# Patient Record
Sex: MALE | Race: WHITE | NOT HISPANIC OR LATINO | Employment: UNEMPLOYED | URBAN - METROPOLITAN AREA
[De-identification: names, ages, dates, MRNs, and addresses within clinical notes are randomized per-mention and may not be internally consistent; named-entity substitution may affect disease eponyms.]

---

## 2024-05-04 ENCOUNTER — APPOINTMENT (OUTPATIENT)
Dept: CARDIOLOGY | Facility: HOSPITAL | Age: 34
End: 2024-05-04
Payer: MEDICAID

## 2024-05-04 ENCOUNTER — HOSPITAL ENCOUNTER (OUTPATIENT)
Dept: CARDIOLOGY | Facility: HOSPITAL | Age: 34
Discharge: HOME | End: 2024-05-04
Payer: MEDICAID

## 2024-05-04 ENCOUNTER — HOSPITAL ENCOUNTER (EMERGENCY)
Facility: HOSPITAL | Age: 34
Discharge: HOME | End: 2024-05-04
Attending: STUDENT IN AN ORGANIZED HEALTH CARE EDUCATION/TRAINING PROGRAM
Payer: MEDICAID

## 2024-05-04 ENCOUNTER — APPOINTMENT (OUTPATIENT)
Dept: RADIOLOGY | Facility: HOSPITAL | Age: 34
End: 2024-05-04
Payer: MEDICAID

## 2024-05-04 VITALS
TEMPERATURE: 98.6 F | BODY MASS INDEX: 25.18 KG/M2 | RESPIRATION RATE: 19 BRPM | WEIGHT: 170 LBS | OXYGEN SATURATION: 98 % | HEART RATE: 78 BPM | HEIGHT: 69 IN | DIASTOLIC BLOOD PRESSURE: 77 MMHG | SYSTOLIC BLOOD PRESSURE: 144 MMHG

## 2024-05-04 DIAGNOSIS — F14.90 COCAINE USE: Primary | ICD-10-CM

## 2024-05-04 DIAGNOSIS — F19.90 POLYSUBSTANCE USE DISORDER: ICD-10-CM

## 2024-05-04 LAB
ALBUMIN SERPL BCP-MCNC: 4.5 G/DL (ref 3.4–5)
ALP SERPL-CCNC: 62 U/L (ref 33–120)
ALT SERPL W P-5'-P-CCNC: 17 U/L (ref 10–52)
ANION GAP BLDV CALCULATED.4IONS-SCNC: 11 MMOL/L (ref 10–25)
ANION GAP SERPL CALC-SCNC: 12 MMOL/L (ref 10–20)
APAP SERPL-MCNC: <10 UG/ML
APTT PPP: 35 SECONDS (ref 27–38)
AST SERPL W P-5'-P-CCNC: 22 U/L (ref 9–39)
BASE EXCESS BLDV CALC-SCNC: 3.5 MMOL/L (ref -2–3)
BASOPHILS # BLD AUTO: 0.02 X10*3/UL (ref 0–0.1)
BASOPHILS NFR BLD AUTO: 0.4 %
BILIRUB SERPL-MCNC: 0.8 MG/DL (ref 0–1.2)
BNP SERPL-MCNC: 12 PG/ML (ref 0–99)
BODY TEMPERATURE: ABNORMAL
BUN SERPL-MCNC: 14 MG/DL (ref 6–23)
CA-I BLDV-SCNC: 1.21 MMOL/L (ref 1.1–1.33)
CALCIUM SERPL-MCNC: 9.9 MG/DL (ref 8.6–10.3)
CARDIAC TROPONIN I PNL SERPL HS: 6 NG/L (ref 0–20)
CARDIAC TROPONIN I PNL SERPL HS: 7 NG/L (ref 0–20)
CHLORIDE BLDV-SCNC: 103 MMOL/L (ref 98–107)
CHLORIDE SERPL-SCNC: 103 MMOL/L (ref 98–107)
CK SERPL-CCNC: 165 U/L (ref 0–325)
CO2 SERPL-SCNC: 26 MMOL/L (ref 21–32)
CREAT SERPL-MCNC: 0.91 MG/DL (ref 0.5–1.3)
EGFRCR SERPLBLD CKD-EPI 2021: >90 ML/MIN/1.73M*2
EOSINOPHIL # BLD AUTO: 0.04 X10*3/UL (ref 0–0.7)
EOSINOPHIL NFR BLD AUTO: 0.8 %
ERYTHROCYTE [DISTWIDTH] IN BLOOD BY AUTOMATED COUNT: 14.3 % (ref 11.5–14.5)
ETHANOL SERPL-MCNC: <10 MG/DL
GLUCOSE BLDV-MCNC: 82 MG/DL (ref 74–99)
GLUCOSE SERPL-MCNC: 82 MG/DL (ref 74–99)
HCO3 BLDV-SCNC: 26.7 MMOL/L (ref 22–26)
HCT VFR BLD AUTO: 39.9 % (ref 41–52)
HCT VFR BLD EST: 40 % (ref 41–52)
HGB BLD-MCNC: 12.8 G/DL (ref 13.5–17.5)
HGB BLDV-MCNC: 13.4 G/DL (ref 13.5–17.5)
HOLD SPECIMEN: NORMAL
IMM GRANULOCYTES # BLD AUTO: 0.01 X10*3/UL (ref 0–0.7)
IMM GRANULOCYTES NFR BLD AUTO: 0.2 % (ref 0–0.9)
INHALED O2 CONCENTRATION: 21 %
INR PPP: 1.4 (ref 0.9–1.1)
LACTATE BLDV-SCNC: 1.9 MMOL/L (ref 0.4–2)
LYMPHOCYTES # BLD AUTO: 1.06 X10*3/UL (ref 1.2–4.8)
LYMPHOCYTES NFR BLD AUTO: 20.7 %
MAGNESIUM SERPL-MCNC: 1.6 MG/DL (ref 1.6–2.4)
MCH RBC QN AUTO: 24.5 PG (ref 26–34)
MCHC RBC AUTO-ENTMCNC: 32.1 G/DL (ref 32–36)
MCV RBC AUTO: 76 FL (ref 80–100)
MONOCYTES # BLD AUTO: 0.25 X10*3/UL (ref 0.1–1)
MONOCYTES NFR BLD AUTO: 4.9 %
NEUTROPHILS # BLD AUTO: 3.74 X10*3/UL (ref 1.2–7.7)
NEUTROPHILS NFR BLD AUTO: 73 %
NRBC BLD-RTO: 0 /100 WBCS (ref 0–0)
OXYHGB MFR BLDV: 44.4 % (ref 45–75)
PCO2 BLDV: 35 MM HG (ref 41–51)
PH BLDV: 7.49 PH (ref 7.33–7.43)
PLATELET # BLD AUTO: 166 X10*3/UL (ref 150–450)
PO2 BLDV: 28 MM HG (ref 35–45)
POTASSIUM BLDV-SCNC: 3.6 MMOL/L (ref 3.5–5.3)
POTASSIUM SERPL-SCNC: 3.5 MMOL/L (ref 3.5–5.3)
PROT SERPL-MCNC: 7.5 G/DL (ref 6.4–8.2)
PROTHROMBIN TIME: 16.1 SECONDS (ref 9.8–12.8)
RBC # BLD AUTO: 5.22 X10*6/UL (ref 4.5–5.9)
SALICYLATES SERPL-MCNC: <3 MG/DL
SAO2 % BLDV: 48 % (ref 45–75)
SODIUM BLDV-SCNC: 137 MMOL/L (ref 136–145)
SODIUM SERPL-SCNC: 137 MMOL/L (ref 136–145)
WBC # BLD AUTO: 5.1 X10*3/UL (ref 4.4–11.3)

## 2024-05-04 PROCEDURE — 71045 X-RAY EXAM CHEST 1 VIEW: CPT | Performed by: RADIOLOGY

## 2024-05-04 PROCEDURE — 99284 EMERGENCY DEPT VISIT MOD MDM: CPT | Mod: 25

## 2024-05-04 PROCEDURE — 36415 COLL VENOUS BLD VENIPUNCTURE: CPT | Performed by: EMERGENCY MEDICINE

## 2024-05-04 PROCEDURE — 93005 ELECTROCARDIOGRAM TRACING: CPT | Mod: 59

## 2024-05-04 PROCEDURE — 84484 ASSAY OF TROPONIN QUANT: CPT | Performed by: EMERGENCY MEDICINE

## 2024-05-04 PROCEDURE — 82947 ASSAY GLUCOSE BLOOD QUANT: CPT | Mod: 59 | Performed by: EMERGENCY MEDICINE

## 2024-05-04 PROCEDURE — 85025 COMPLETE CBC W/AUTO DIFF WBC: CPT | Performed by: EMERGENCY MEDICINE

## 2024-05-04 PROCEDURE — 71045 X-RAY EXAM CHEST 1 VIEW: CPT

## 2024-05-04 PROCEDURE — 85730 THROMBOPLASTIN TIME PARTIAL: CPT | Performed by: EMERGENCY MEDICINE

## 2024-05-04 PROCEDURE — 93005 ELECTROCARDIOGRAM TRACING: CPT

## 2024-05-04 PROCEDURE — 80143 DRUG ASSAY ACETAMINOPHEN: CPT | Performed by: EMERGENCY MEDICINE

## 2024-05-04 PROCEDURE — 82550 ASSAY OF CK (CPK): CPT | Performed by: EMERGENCY MEDICINE

## 2024-05-04 PROCEDURE — 83735 ASSAY OF MAGNESIUM: CPT | Performed by: EMERGENCY MEDICINE

## 2024-05-04 PROCEDURE — 85610 PROTHROMBIN TIME: CPT | Performed by: EMERGENCY MEDICINE

## 2024-05-04 PROCEDURE — 99284 EMERGENCY DEPT VISIT MOD MDM: CPT | Performed by: STUDENT IN AN ORGANIZED HEALTH CARE EDUCATION/TRAINING PROGRAM

## 2024-05-04 PROCEDURE — 83880 ASSAY OF NATRIURETIC PEPTIDE: CPT | Performed by: EMERGENCY MEDICINE

## 2024-05-04 PROCEDURE — 84132 ASSAY OF SERUM POTASSIUM: CPT | Performed by: EMERGENCY MEDICINE

## 2024-05-04 RX ORDER — NALOXONE HYDROCHLORIDE 4 MG/.1ML
SPRAY NASAL
Status: DISCONTINUED
Start: 2024-05-04 | End: 2024-05-04 | Stop reason: HOSPADM

## 2024-05-04 RX ORDER — NAPROXEN SODIUM 220 MG/1
324 TABLET, FILM COATED ORAL ONCE
Status: DISCONTINUED | OUTPATIENT
Start: 2024-05-04 | End: 2024-05-04

## 2024-05-04 RX ORDER — NITROGLYCERIN 0.4 MG/1
0.4 TABLET SUBLINGUAL ONCE
Status: DISCONTINUED | OUTPATIENT
Start: 2024-05-04 | End: 2024-05-04

## 2024-05-04 RX ORDER — NALOXONE HYDROCHLORIDE 4 MG/.1ML
4 SPRAY NASAL AS NEEDED
Status: ACTIVE
Start: 2024-05-04 | End: 2024-05-05

## 2024-05-04 ASSESSMENT — LIFESTYLE VARIABLES
EVER HAD A DRINK FIRST THING IN THE MORNING TO STEADY YOUR NERVES TO GET RID OF A HANGOVER: NO
HAVE YOU EVER FELT YOU SHOULD CUT DOWN ON YOUR DRINKING: NO
HAVE PEOPLE ANNOYED YOU BY CRITICIZING YOUR DRINKING: NO
TOTAL SCORE: 0
EVER FELT BAD OR GUILTY ABOUT YOUR DRINKING: NO

## 2024-05-04 ASSESSMENT — COLUMBIA-SUICIDE SEVERITY RATING SCALE - C-SSRS
6. HAVE YOU EVER DONE ANYTHING, STARTED TO DO ANYTHING, OR PREPARED TO DO ANYTHING TO END YOUR LIFE?: NO
1. IN THE PAST MONTH, HAVE YOU WISHED YOU WERE DEAD OR WISHED YOU COULD GO TO SLEEP AND NOT WAKE UP?: NO
2. HAVE YOU ACTUALLY HAD ANY THOUGHTS OF KILLING YOURSELF?: NO

## 2024-05-04 ASSESSMENT — PAIN SCALES - GENERAL
PAINLEVEL_OUTOF10: 6
PAINLEVEL_OUTOF10: 0 - NO PAIN
PAINLEVEL_OUTOF10: 0 - NO PAIN

## 2024-05-04 ASSESSMENT — PAIN - FUNCTIONAL ASSESSMENT
PAIN_FUNCTIONAL_ASSESSMENT: 0-10
PAIN_FUNCTIONAL_ASSESSMENT: 0-10

## 2024-05-04 ASSESSMENT — PAIN DESCRIPTION - DESCRIPTORS: DESCRIPTORS: ACHING

## 2024-05-04 ASSESSMENT — PAIN DESCRIPTION - LOCATION: LOCATION: CHEST

## 2024-05-04 NOTE — Clinical Note
Pt left with treatment incomplete. This nurse removed IV attempted to have Dr. Rapp speak to pt but pt walked out.

## 2024-05-04 NOTE — ED PROVIDER NOTES
"EMERGENCY DEPARTMENT ENCOUNTER      Pt Name: Emmanuel Darby  MRN: [id number]  Birthdate 1990  Date of evaluation: 5/4/2024  Provider: Alexi Rapp DO    CHIEF COMPLAINT       Chief Complaint   Patient presents with    Chest Pain     HISTORY OF PRESENT ILLNESS    Emmanuel Darby is a 33 y.o. year old male who presents to the ER for chest pain. He was driving, got pulled over. Took a hit of crack via smoking. Was arrested, told police he had chest pain.   Currently rates 6/10, left sided, nonradiating, crushing. + nausea, + vomiting, - dyspnea, - fainting, no recent urinary or stool changes  No unexpected weight changes  Is homeless, was previously incarcerated until 1 year ago    PMH: MI 2023, CHF, polysubstance use, doesn't have a doctor. Has been off his meds for 1 year.   PSH: inguinal herniorrhaphy;l  FH: grandmother -[place]  NKDA  + tobacco, - alcohol, + heroin IVDU, no Hx endocarditis     PAST MEDICAL HISTORY     Past Medical History:   Diagnosis Date    CHF (congestive heart failure) (Multi)     MI (myocardial infarction) (Multi)      CURRENT MEDICATIONS       Discharge Medication List as of 5/4/2024  7:37 PM        SURGICAL HISTORY       Past Surgical History:   Procedure Laterality Date    HERNIA REPAIR       ALLERGIES     Patient has no known allergies.  FAMILY HISTORY     No family history on file.  SOCIAL HISTORY       Social History     Tobacco Use    Smoking status: Every Day     Current packs/day: 1.00     Types: Cigarettes    Smokeless tobacco: Never   Substance Use Topics    Alcohol use: Never    Drug use: Yes     Types: \"Crack\" cocaine, Cocaine, Heroin, MDMA (ecstacy)     PHYSICAL EXAM  (up to 7 for level 4, 8 or more for level 5)     ED Triage Vitals   Temp Pulse Resp BP   -- -- -- --      SpO2 Temp src Heart Rate Source Patient Position   -- -- -- --      BP Location FiO2 (%)     -- --       Physical Exam  Vitals and nursing note reviewed.   Constitutional:       General: He is not in acute distress.   "   Appearance: Normal appearance. He is not ill-appearing.   HENT:      Head: Normocephalic and atraumatic. No raccoon eyes, Ruffin's sign, contusion or laceration.      Jaw: No trismus or malocclusion.      Right Ear: External ear normal.      Left Ear: External ear normal.      Mouth/Throat:      Mouth: Mucous membranes are moist.      Pharynx: Oropharynx is clear.   Eyes:      Extraocular Movements: Extraocular movements intact.      Pupils: Pupils are equal, round, and reactive to light.   Neck:      Trachea: No tracheal deviation.   Cardiovascular:      Rate and Rhythm: Normal rate and regular rhythm.      Pulses: Normal pulses.   Pulmonary:      Effort: No respiratory distress.      Breath sounds: No wheezing, rhonchi or rales.   Chest:      Chest wall: No tenderness.   Abdominal:      General: Abdomen is flat.      Palpations: Abdomen is soft. There is no mass.      Tenderness: There is no abdominal tenderness.   Musculoskeletal:         General: No tenderness or signs of injury.      Cervical back: Normal range of motion. No tenderness.      Right lower leg: No edema.      Left lower leg: No edema.   Skin:     Coloration: Skin is not jaundiced or pale.      Findings: No petechiae, rash or wound.   Neurological:      General: No focal deficit present.      Mental Status: He is alert.   Psychiatric:         Speech: Speech normal.         Thought Content: Thought content does not include homicidal or suicidal ideation.        DIAGNOSTIC RESULTS   LABS:  Labs Reviewed   CBC WITH AUTO DIFFERENTIAL - Abnormal       Result Value    WBC 5.1      nRBC 0.0      RBC 5.22      Hemoglobin 12.8 (*)     Hematocrit 39.9 (*)     MCV 76 (*)     MCH 24.5 (*)     MCHC 32.1      RDW 14.3      Platelets 166      Neutrophils % 73.0      Immature Granulocytes %, Automated 0.2      Lymphocytes % 20.7      Monocytes % 4.9      Eosinophils % 0.8      Basophils % 0.4      Neutrophils Absolute 3.74      Immature Granulocytes Absolute,  Automated 0.01      Lymphocytes Absolute 1.06 (*)     Monocytes Absolute 0.25      Eosinophils Absolute 0.04      Basophils Absolute 0.02     PROTIME-INR - Abnormal    Protime 16.1 (*)     INR 1.4 (*)    BLOOD GAS VENOUS FULL PANEL - Abnormal    POCT pH, Venous 7.49 (*)     POCT pCO2, Venous 35 (*)     POCT pO2, Venous 28 (*)     POCT SO2, Venous 48      POCT Oxy Hemoglobin, Venous 44.4 (*)     POCT Hematocrit Calculated, Venous 40.0 (*)     POCT Sodium, Venous 137      POCT Potassium, Venous 3.6      POCT Chloride, Venous 103      POCT Ionized Calicum, Venous 1.21      POCT Glucose, Venous 82      POCT Lactate, Venous 1.9      POCT Base Excess, Venous 3.5 (*)     POCT HCO3 Calculated, Venous 26.7 (*)     POCT Hemoglobin, Venous 13.4 (*)     POCT Anion Gap, Venous 11.0      Patient Temperature        FiO2 21     COMPREHENSIVE METABOLIC PANEL - Normal    Glucose 82      Sodium 137      Potassium 3.5      Chloride 103      Bicarbonate 26      Anion Gap 12      Urea Nitrogen 14      Creatinine 0.91      eGFR >90      Calcium 9.9      Albumin 4.5      Alkaline Phosphatase 62      Total Protein 7.5      AST 22      Bilirubin, Total 0.8      ALT 17     MAGNESIUM - Normal    Magnesium 1.60     APTT - Normal    aPTT 35      Narrative:     The APTT is no longer used for monitoring Unfractionated Heparin Therapy. For monitoring Heparin Therapy, use the Heparin Assay.   B-TYPE NATRIURETIC PEPTIDE - Normal    BNP 12      Narrative:        <100 pg/mL - Heart failure unlikely  100-299 pg/mL - Intermediate probability of acute heart                  failure exacerbation. Correlate with clinical                  context and patient history.    >=300 pg/mL - Heart Failure likely. Correlate with clinical                  context and patient history.    BNP testing is performed using different testing methodology at [facility] than at other [facility]. Direct result comparisons should only be made within the same  method.      SERIAL TROPONIN-INITIAL - Normal    Troponin I, High Sensitivity 6      Narrative:     Less than 99th percentile of normal range cutoff-  Female and children under 18 years old <14 ng/L; Male <21 ng/L: Negative  Repeat testing should be performed if clinically indicated.     Female and children under 18 years old 14-50 ng/L; Male 21-50 ng/L:  Consistent with possible cardiac damage and possible increased clinical   risk. Serial measurements may help to assess extent of myocardial damage.     >50 ng/L: Consistent with cardiac damage, increased clinical risk and  myocardial infarction. Serial measurements may help assess extent of   myocardial damage.      NOTE: Children less than 1 year old may have higher baseline troponin   levels and results should be interpreted in conjunction with the overall   clinical context.     NOTE: Troponin I testing is performed using a different   testing methodology at [facility] than at other   [facility]. Direct result comparisons should only   be made within the same method.   ACUTE TOXICOLOGY PANEL, BLOOD - Normal    Acetaminophen <10.0      Salicylate  <3      Alcohol <10     SERIAL TROPONIN, 1 HOUR - Normal    Troponin I, High Sensitivity 7      Narrative:     Less than 99th percentile of normal range cutoff-  Female and children under 18 years old <14 ng/L; Male <21 ng/L: Negative  Repeat testing should be performed if clinically indicated.     Female and children under 18 years old 14-50 ng/L; Male 21-50 ng/L:  Consistent with possible cardiac damage and possible increased clinical   risk. Serial measurements may help to assess extent of myocardial damage.     >50 ng/L: Consistent with cardiac damage, increased clinical risk and  myocardial infarction. Serial measurements may help assess extent of   myocardial damage.      NOTE: Children less than 1 year old may have higher baseline troponin   levels and results should be interpreted in  conjunction with the overall   clinical context.     NOTE: Troponin I testing is performed using a different   testing methodology at [facility] than at other   [facility] hospitals. Direct result comparisons should only   be made within the same method.   CREATINE KINASE - Normal    Creatine Kinase 165     TROPONIN SERIES- (INITIAL, 1 HR)    Narrative:     The following orders were created for panel order Troponin I Series, High Sensitivity (0, 1 HR).  Procedure                               Abnormality         Status                     ---------                               -----------         ------                     Troponin I, High Sensiti...[[id number]]  Normal              Final result               Troponin, High Sensitivi...[[id number]]  Normal              Final result                 Please view results for these tests on the individual orders.     All other labs were within normal range or not returned as of this dictation.  Imaging  XR chest 1 view   Final Result   1.  No active cardiopulmonary process.             Signed by: Patrick Samaniego 5/4/2024 5:24 PM   Dictation workstation:   [id number]         Procedure  Procedures  EMERGENCY DEPARTMENT COURSE/MDM:   Medical Decision Making    Vitals:    Vitals:    05/04/24 1800 05/04/24 1830 05/04/24 1930 05/04/24 1949   BP: (!) 130/96 115/88 144/77 144/77   BP Location:   Left arm Left arm   Patient Position:   Lying Sitting   Pulse: 56 62 58 78   Resp: 12 17 (!) 21 19   Temp:   37 °C (98.6 °F) 37 °C (98.6 °F)   TempSrc:   Temporal Temporal   SpO2: 98% 100% 98% 98%   Weight:       Height:         Emmanuel Darby is a male 33 y.o. who presents to the ER for cocaine induced chest pain. On arrival the patients vital signs were: Afebrile, Tachycardic, Normotensive, Regular RR, and Oxygenating well on room air. History obtained from: patient.  Cardiac workup initiated.  ED Course as of 05/04/24 2130   Sat May 04, 2024   1531 Patient eloped, IV removed prior  to leaving. [CB]   1602 Law enforcement returned patient to the ED. chest pain increased to 7/10, patient is now diaphoretic, pale, tachypneic, breathing heavily, somnolent [CB]   1642 BLOOD GAS VENOUS FULL PANEL(!)  Respiratory alkalosis, no lactic acidemia [CB]   1644 CBC and Auto Differential(!)  No acute leukocytosis, leukopenia, thrombocytosis, thrombocytopenia, or anemia [CB]   1727 Comprehensive Metabolic Panel  Normoglycemic, no acute electrolyte or hepatorenal abnormality [CB]   1727 Creatine Kinase  No rhabdo [CB]   1727 Acute Toxicology Panel, Blood  No elevation of alcohol, salicylate, acetaminophen [CB]   1839 Under assessment chest pain improved to 3/10 [CB]   1839 XR chest 1 view  IMPRESSION:  1.  No active cardiopulmonary process.   [CB]   1839 Troponin I, High Sensitivity: 6  Not elevated, ACS less likely [CB]   1839 B-Type Natriuretic Peptide  Not elevated, heart failure less likely [CB]   1839 Acute Toxicology Panel, Blood  No elevation of alcohol, salicylate, acetaminophen [CB]   1931 Troponin I Series, High Sensitivity (0, 1 HR)  No delta Trope, ACS unlikely. [CB]   1938 On reassessment alert no longer slurring no longer pale no longer diaphoretic vital signs stable. [CB]      ED Course User Index  [CB] Alexi Rapp DO         Diagnoses as of 05/04/24 2130   Cocaine use   Polysubstance use disorder     External Records Reviewed: I reviewed recent and relevant outside records including inpatient notes, outpatient records    Shared decision making for disposition  Patient and/or patient´s representative was counseled regarding labs, imaging, likely diagnosis. All questions were answered. Recommendation was made   for discharge home. The patient agreed and was discharged home in stable condition with appropriate relevant educational materials. Return precautions were provided which included new or worsening chest pain, shortness of breath, fever of 38C (100.4) or higher, persistent vomiting,  weakness, numbness, tingling, excessive sweating,  loss of motion in your arms or legs, fainting, vision changes, or any new or worsening symptoms..     ED Medications administered this visit:    Medications   oxygen (O2) therapy (has no administration in time range)   naloxone (Narcan) nasal spray  - [employer] Override Pull (has no administration in time range)       New Prescriptions from this visit:    Discharge Medication List as of 5/4/2024  7:37 PM        START taking these medications    Details   naloxone (Narcan) 4 mg/0.1 mL nasal spray Administer 1 spray (4 mg) into affected nostril(s) if needed for opioid reversal for up to 1 day. Give 1 spray as a single dose in one nostril. May repeat every 2-3 minutes in alternating nostrils until medical assistance becomes available., Starting Sat  5/4/2024, Until Sun 5/5/2024 at 2359, No Print             Follow-up:  Ravi Gerber MD  [id number] [facility] [id number]              Final Impression:   1. Cocaine use    2. Polysubstance use disorder          Please excuse any misspellings or unintended errors related to the Dragon speech recognition software used to dictate this note.    I reviewed the case with the attending ED physician. The attending ED physician agrees with the plan.      Alexi Rapp DO  Resident  05/04/24 3230

## 2024-05-05 LAB
ATRIAL RATE: 84 BPM
ATRIAL RATE: 89 BPM
P AXIS: 61 DEGREES
P AXIS: 73 DEGREES
P OFFSET: 207 MS
P OFFSET: 208 MS
P ONSET: 156 MS
P ONSET: 158 MS
PR INTERVAL: 124 MS
PR INTERVAL: 128 MS
Q ONSET: 220 MS
Q ONSET: 220 MS
QRS COUNT: 14 BEATS
QRS COUNT: 14 BEATS
QRS DURATION: 78 MS
QRS DURATION: 82 MS
QT INTERVAL: 358 MS
QT INTERVAL: 360 MS
QTC CALCULATION(BAZETT): 425 MS
QTC CALCULATION(BAZETT): 435 MS
QTC FREDERICIA: 402 MS
QTC FREDERICIA: 408 MS
R AXIS: 28 DEGREES
R AXIS: 64 DEGREES
T AXIS: 56 DEGREES
T AXIS: 65 DEGREES
T OFFSET: 399 MS
T OFFSET: 400 MS
VENTRICULAR RATE: 84 BPM
VENTRICULAR RATE: 89 BPM

## 2024-05-09 LAB
ATRIAL RATE: 85 BPM
P AXIS: 67 DEGREES
P OFFSET: 204 MS
P ONSET: 154 MS
PR INTERVAL: 128 MS
Q ONSET: 218 MS
QRS COUNT: 14 BEATS
QRS DURATION: 80 MS
QT INTERVAL: 384 MS
QTC CALCULATION(BAZETT): 456 MS
QTC FREDERICIA: 431 MS
R AXIS: 70 DEGREES
T AXIS: 69 DEGREES
T OFFSET: 410 MS
VENTRICULAR RATE: 85 BPM

## 2024-06-06 ENCOUNTER — APPOINTMENT (OUTPATIENT)
Dept: RADIOLOGY | Facility: HOSPITAL | Age: 34
End: 2024-06-06
Payer: COMMERCIAL

## 2024-06-06 ENCOUNTER — HOSPITAL ENCOUNTER (EMERGENCY)
Facility: HOSPITAL | Age: 34
Discharge: HOME | End: 2024-06-07
Attending: STUDENT IN AN ORGANIZED HEALTH CARE EDUCATION/TRAINING PROGRAM
Payer: COMMERCIAL

## 2024-06-06 ENCOUNTER — APPOINTMENT (OUTPATIENT)
Dept: CARDIOLOGY | Facility: HOSPITAL | Age: 34
End: 2024-06-06
Payer: COMMERCIAL

## 2024-06-06 VITALS
RESPIRATION RATE: 18 BRPM | BODY MASS INDEX: 24.34 KG/M2 | SYSTOLIC BLOOD PRESSURE: 128 MMHG | HEIGHT: 70 IN | OXYGEN SATURATION: 98 % | HEART RATE: 75 BPM | WEIGHT: 170 LBS | TEMPERATURE: 98.4 F | DIASTOLIC BLOOD PRESSURE: 85 MMHG

## 2024-06-06 DIAGNOSIS — R07.9 CHEST PAIN, UNSPECIFIED TYPE: Primary | ICD-10-CM

## 2024-06-06 LAB
ALBUMIN SERPL BCP-MCNC: 3.9 G/DL (ref 3.4–5)
ALP SERPL-CCNC: 61 U/L (ref 33–120)
ALT SERPL W P-5'-P-CCNC: 16 U/L (ref 10–52)
ANION GAP SERPL CALC-SCNC: 14 MMOL/L (ref 10–20)
AST SERPL W P-5'-P-CCNC: 18 U/L (ref 9–39)
BASOPHILS # BLD AUTO: 0.03 X10*3/UL (ref 0–0.1)
BASOPHILS NFR BLD AUTO: 0.7 %
BILIRUB SERPL-MCNC: 0.6 MG/DL (ref 0–1.2)
BNP SERPL-MCNC: 35 PG/ML (ref 0–99)
BUN SERPL-MCNC: 9 MG/DL (ref 6–23)
CALCIUM SERPL-MCNC: 9.4 MG/DL (ref 8.6–10.3)
CARDIAC TROPONIN I PNL SERPL HS: 5 NG/L (ref 0–20)
CARDIAC TROPONIN I PNL SERPL HS: 8 NG/L (ref 0–20)
CHLORIDE SERPL-SCNC: 108 MMOL/L (ref 98–107)
CO2 SERPL-SCNC: 21 MMOL/L (ref 21–32)
CREAT SERPL-MCNC: 0.9 MG/DL (ref 0.5–1.3)
EGFRCR SERPLBLD CKD-EPI 2021: >90 ML/MIN/1.73M*2
EOSINOPHIL # BLD AUTO: 0.08 X10*3/UL (ref 0–0.7)
EOSINOPHIL NFR BLD AUTO: 1.8 %
ERYTHROCYTE [DISTWIDTH] IN BLOOD BY AUTOMATED COUNT: 16.2 % (ref 11.5–14.5)
GLUCOSE SERPL-MCNC: 128 MG/DL (ref 74–99)
HCT VFR BLD AUTO: 35.4 % (ref 41–52)
HGB BLD-MCNC: 11.8 G/DL (ref 13.5–17.5)
IMM GRANULOCYTES # BLD AUTO: 0.01 X10*3/UL (ref 0–0.7)
IMM GRANULOCYTES NFR BLD AUTO: 0.2 % (ref 0–0.9)
LIPASE SERPL-CCNC: 21 U/L (ref 9–82)
LYMPHOCYTES # BLD AUTO: 2.04 X10*3/UL (ref 1.2–4.8)
LYMPHOCYTES NFR BLD AUTO: 47.1 %
MAGNESIUM SERPL-MCNC: 1.69 MG/DL (ref 1.6–2.4)
MCH RBC QN AUTO: 25.3 PG (ref 26–34)
MCHC RBC AUTO-ENTMCNC: 33.3 G/DL (ref 32–36)
MCV RBC AUTO: 76 FL (ref 80–100)
MONOCYTES # BLD AUTO: 0.27 X10*3/UL (ref 0.1–1)
MONOCYTES NFR BLD AUTO: 6.2 %
NEUTROPHILS # BLD AUTO: 1.9 X10*3/UL (ref 1.2–7.7)
NEUTROPHILS NFR BLD AUTO: 44 %
NRBC BLD-RTO: 0 /100 WBCS (ref 0–0)
PLATELET # BLD AUTO: 181 X10*3/UL (ref 150–450)
POTASSIUM SERPL-SCNC: 3.5 MMOL/L (ref 3.5–5.3)
PROT SERPL-MCNC: 6.3 G/DL (ref 6.4–8.2)
RBC # BLD AUTO: 4.67 X10*6/UL (ref 4.5–5.9)
SARS-COV-2 RNA RESP QL NAA+PROBE: NOT DETECTED
SODIUM SERPL-SCNC: 139 MMOL/L (ref 136–145)
WBC # BLD AUTO: 4.3 X10*3/UL (ref 4.4–11.3)

## 2024-06-06 PROCEDURE — 2500000004 HC RX 250 GENERAL PHARMACY W/ HCPCS (ALT 636 FOR OP/ED): Performed by: STUDENT IN AN ORGANIZED HEALTH CARE EDUCATION/TRAINING PROGRAM

## 2024-06-06 PROCEDURE — 99284 EMERGENCY DEPT VISIT MOD MDM: CPT | Mod: 25

## 2024-06-06 PROCEDURE — 96374 THER/PROPH/DIAG INJ IV PUSH: CPT

## 2024-06-06 PROCEDURE — 83880 ASSAY OF NATRIURETIC PEPTIDE: CPT | Performed by: STUDENT IN AN ORGANIZED HEALTH CARE EDUCATION/TRAINING PROGRAM

## 2024-06-06 PROCEDURE — 71045 X-RAY EXAM CHEST 1 VIEW: CPT | Performed by: RADIOLOGY

## 2024-06-06 PROCEDURE — 80053 COMPREHEN METABOLIC PANEL: CPT | Performed by: STUDENT IN AN ORGANIZED HEALTH CARE EDUCATION/TRAINING PROGRAM

## 2024-06-06 PROCEDURE — 87635 SARS-COV-2 COVID-19 AMP PRB: CPT | Performed by: STUDENT IN AN ORGANIZED HEALTH CARE EDUCATION/TRAINING PROGRAM

## 2024-06-06 PROCEDURE — 84484 ASSAY OF TROPONIN QUANT: CPT | Performed by: STUDENT IN AN ORGANIZED HEALTH CARE EDUCATION/TRAINING PROGRAM

## 2024-06-06 PROCEDURE — 93005 ELECTROCARDIOGRAM TRACING: CPT

## 2024-06-06 PROCEDURE — 36415 COLL VENOUS BLD VENIPUNCTURE: CPT | Performed by: STUDENT IN AN ORGANIZED HEALTH CARE EDUCATION/TRAINING PROGRAM

## 2024-06-06 PROCEDURE — 71045 X-RAY EXAM CHEST 1 VIEW: CPT

## 2024-06-06 PROCEDURE — 85025 COMPLETE CBC W/AUTO DIFF WBC: CPT | Performed by: STUDENT IN AN ORGANIZED HEALTH CARE EDUCATION/TRAINING PROGRAM

## 2024-06-06 PROCEDURE — 83735 ASSAY OF MAGNESIUM: CPT | Performed by: STUDENT IN AN ORGANIZED HEALTH CARE EDUCATION/TRAINING PROGRAM

## 2024-06-06 PROCEDURE — 83690 ASSAY OF LIPASE: CPT | Performed by: STUDENT IN AN ORGANIZED HEALTH CARE EDUCATION/TRAINING PROGRAM

## 2024-06-06 RX ORDER — LORAZEPAM 2 MG/ML
1 INJECTION INTRAMUSCULAR ONCE
Status: COMPLETED | OUTPATIENT
Start: 2024-06-06 | End: 2024-06-06

## 2024-06-06 RX ADMIN — LORAZEPAM 1 MG: 2 INJECTION, SOLUTION INTRAMUSCULAR; INTRAVENOUS at 21:57

## 2024-06-06 ASSESSMENT — COLUMBIA-SUICIDE SEVERITY RATING SCALE - C-SSRS
1. IN THE PAST MONTH, HAVE YOU WISHED YOU WERE DEAD OR WISHED YOU COULD GO TO SLEEP AND NOT WAKE UP?: NO
2. HAVE YOU ACTUALLY HAD ANY THOUGHTS OF KILLING YOURSELF?: NO
6. HAVE YOU EVER DONE ANYTHING, STARTED TO DO ANYTHING, OR PREPARED TO DO ANYTHING TO END YOUR LIFE?: NO

## 2024-06-06 ASSESSMENT — LIFESTYLE VARIABLES
EVER FELT BAD OR GUILTY ABOUT YOUR DRINKING: NO
EVER HAD A DRINK FIRST THING IN THE MORNING TO STEADY YOUR NERVES TO GET RID OF A HANGOVER: NO
HAVE YOU EVER FELT YOU SHOULD CUT DOWN ON YOUR DRINKING: NO
TOTAL SCORE: 0
HAVE PEOPLE ANNOYED YOU BY CRITICIZING YOUR DRINKING: NO

## 2024-06-06 ASSESSMENT — HEART SCORE
HISTORY: SLIGHTLY SUSPICIOUS
ECG: NORMAL
RISK FACTORS: >2 RISK FACTORS OR HX OF ATHEROSCLEROTIC DISEASE
AGE: <45
HEART SCORE: 2
TROPONIN: LESS THAN OR EQUAL TO NORMAL LIMIT

## 2024-06-06 ASSESSMENT — PAIN SCALES - GENERAL: PAINLEVEL_OUTOF10: 7

## 2024-06-06 ASSESSMENT — PAIN DESCRIPTION - FREQUENCY: FREQUENCY: CONSTANT/CONTINUOUS

## 2024-06-06 ASSESSMENT — PAIN DESCRIPTION - LOCATION: LOCATION: CHEST

## 2024-06-06 ASSESSMENT — PAIN DESCRIPTION - DESCRIPTORS
DESCRIPTORS: STABBING
DESCRIPTORS: STABBING

## 2024-06-06 ASSESSMENT — PAIN DESCRIPTION - ORIENTATION: ORIENTATION: LEFT

## 2024-06-06 ASSESSMENT — PAIN - FUNCTIONAL ASSESSMENT: PAIN_FUNCTIONAL_ASSESSMENT: 0-10

## 2024-06-06 ASSESSMENT — PAIN DESCRIPTION - PAIN TYPE: TYPE: ACUTE PAIN

## 2024-06-07 ENCOUNTER — APPOINTMENT (OUTPATIENT)
Dept: CARDIOLOGY | Facility: HOSPITAL | Age: 34
End: 2024-06-07
Payer: COMMERCIAL

## 2024-06-07 ENCOUNTER — APPOINTMENT (OUTPATIENT)
Dept: RADIOLOGY | Facility: HOSPITAL | Age: 34
End: 2024-06-07
Payer: COMMERCIAL

## 2024-06-07 ENCOUNTER — HOSPITAL ENCOUNTER (EMERGENCY)
Facility: HOSPITAL | Age: 34
Discharge: HOME | End: 2024-06-07
Attending: EMERGENCY MEDICINE
Payer: COMMERCIAL

## 2024-06-07 VITALS
DIASTOLIC BLOOD PRESSURE: 99 MMHG | WEIGHT: 170 LBS | RESPIRATION RATE: 18 BRPM | BODY MASS INDEX: 24.34 KG/M2 | OXYGEN SATURATION: 99 % | HEART RATE: 62 BPM | HEIGHT: 70 IN | SYSTOLIC BLOOD PRESSURE: 158 MMHG | TEMPERATURE: 97.3 F

## 2024-06-07 DIAGNOSIS — D64.9 ANEMIA, UNSPECIFIED TYPE: ICD-10-CM

## 2024-06-07 DIAGNOSIS — R79.89 ELEVATED D-DIMER: ICD-10-CM

## 2024-06-07 DIAGNOSIS — R07.9 CHEST PAIN, UNSPECIFIED TYPE: Primary | ICD-10-CM

## 2024-06-07 LAB
ALBUMIN SERPL BCP-MCNC: 4.2 G/DL (ref 3.4–5)
ALP SERPL-CCNC: 58 U/L (ref 33–120)
ALT SERPL W P-5'-P-CCNC: 18 U/L (ref 10–52)
ANION GAP SERPL CALC-SCNC: 11 MMOL/L (ref 10–20)
AST SERPL W P-5'-P-CCNC: 20 U/L (ref 9–39)
ATRIAL RATE: 97 BPM
BASOPHILS # BLD AUTO: 0.03 X10*3/UL (ref 0–0.1)
BASOPHILS NFR BLD AUTO: 0.8 %
BILIRUB SERPL-MCNC: 1 MG/DL (ref 0–1.2)
BNP SERPL-MCNC: 38 PG/ML (ref 0–99)
BUN SERPL-MCNC: 7 MG/DL (ref 6–23)
CALCIUM SERPL-MCNC: 9.2 MG/DL (ref 8.6–10.3)
CARDIAC TROPONIN I PNL SERPL HS: 7 NG/L (ref 0–20)
CARDIAC TROPONIN I PNL SERPL HS: 8 NG/L (ref 0–20)
CHLORIDE SERPL-SCNC: 107 MMOL/L (ref 98–107)
CO2 SERPL-SCNC: 25 MMOL/L (ref 21–32)
CREAT SERPL-MCNC: 0.82 MG/DL (ref 0.5–1.3)
D DIMER PPP FEU-MCNC: 620 NG/ML FEU
EGFRCR SERPLBLD CKD-EPI 2021: >90 ML/MIN/1.73M*2
EOSINOPHIL # BLD AUTO: 0.06 X10*3/UL (ref 0–0.7)
EOSINOPHIL NFR BLD AUTO: 1.5 %
ERYTHROCYTE [DISTWIDTH] IN BLOOD BY AUTOMATED COUNT: 16.7 % (ref 11.5–14.5)
GLUCOSE SERPL-MCNC: 102 MG/DL (ref 74–99)
HCT VFR BLD AUTO: 39.2 % (ref 41–52)
HGB BLD-MCNC: 12.6 G/DL (ref 13.5–17.5)
IMM GRANULOCYTES # BLD AUTO: 0.01 X10*3/UL (ref 0–0.7)
IMM GRANULOCYTES NFR BLD AUTO: 0.3 % (ref 0–0.9)
LIPASE SERPL-CCNC: 13 U/L (ref 9–82)
LYMPHOCYTES # BLD AUTO: 0.95 X10*3/UL (ref 1.2–4.8)
LYMPHOCYTES NFR BLD AUTO: 24.1 %
MAGNESIUM SERPL-MCNC: 1.78 MG/DL (ref 1.6–2.4)
MCH RBC QN AUTO: 25 PG (ref 26–34)
MCHC RBC AUTO-ENTMCNC: 32.1 G/DL (ref 32–36)
MCV RBC AUTO: 78 FL (ref 80–100)
MONOCYTES # BLD AUTO: 0.2 X10*3/UL (ref 0.1–1)
MONOCYTES NFR BLD AUTO: 5.1 %
NEUTROPHILS # BLD AUTO: 2.69 X10*3/UL (ref 1.2–7.7)
NEUTROPHILS NFR BLD AUTO: 68.2 %
NRBC BLD-RTO: 0 /100 WBCS (ref 0–0)
P AXIS: 71 DEGREES
P OFFSET: 208 MS
P ONSET: 164 MS
PLATELET # BLD AUTO: 182 X10*3/UL (ref 150–450)
POTASSIUM SERPL-SCNC: 3.8 MMOL/L (ref 3.5–5.3)
PR INTERVAL: 110 MS
PROT SERPL-MCNC: 6.9 G/DL (ref 6.4–8.2)
Q ONSET: 219 MS
QRS COUNT: 16 BEATS
QRS DURATION: 76 MS
QT INTERVAL: 362 MS
QTC CALCULATION(BAZETT): 459 MS
QTC FREDERICIA: 424 MS
R AXIS: 70 DEGREES
RBC # BLD AUTO: 5.03 X10*6/UL (ref 4.5–5.9)
SODIUM SERPL-SCNC: 139 MMOL/L (ref 136–145)
T AXIS: 66 DEGREES
T OFFSET: 400 MS
VENTRICULAR RATE: 97 BPM
WBC # BLD AUTO: 3.9 X10*3/UL (ref 4.4–11.3)

## 2024-06-07 PROCEDURE — 80053 COMPREHEN METABOLIC PANEL: CPT | Performed by: EMERGENCY MEDICINE

## 2024-06-07 PROCEDURE — 83690 ASSAY OF LIPASE: CPT | Performed by: EMERGENCY MEDICINE

## 2024-06-07 PROCEDURE — 36415 COLL VENOUS BLD VENIPUNCTURE: CPT | Performed by: EMERGENCY MEDICINE

## 2024-06-07 PROCEDURE — 71045 X-RAY EXAM CHEST 1 VIEW: CPT | Performed by: RADIOLOGY

## 2024-06-07 PROCEDURE — 87040 BLOOD CULTURE FOR BACTERIA: CPT | Mod: ELYLAB | Performed by: EMERGENCY MEDICINE

## 2024-06-07 PROCEDURE — 85379 FIBRIN DEGRADATION QUANT: CPT | Performed by: EMERGENCY MEDICINE

## 2024-06-07 PROCEDURE — 71045 X-RAY EXAM CHEST 1 VIEW: CPT

## 2024-06-07 PROCEDURE — 99285 EMERGENCY DEPT VISIT HI MDM: CPT | Mod: 25

## 2024-06-07 PROCEDURE — 84484 ASSAY OF TROPONIN QUANT: CPT | Performed by: EMERGENCY MEDICINE

## 2024-06-07 PROCEDURE — 71275 CT ANGIOGRAPHY CHEST: CPT | Mod: 52

## 2024-06-07 PROCEDURE — 93005 ELECTROCARDIOGRAM TRACING: CPT

## 2024-06-07 PROCEDURE — 85025 COMPLETE CBC W/AUTO DIFF WBC: CPT | Performed by: EMERGENCY MEDICINE

## 2024-06-07 PROCEDURE — 2550000001 HC RX 255 CONTRASTS: Performed by: EMERGENCY MEDICINE

## 2024-06-07 PROCEDURE — 83880 ASSAY OF NATRIURETIC PEPTIDE: CPT | Performed by: EMERGENCY MEDICINE

## 2024-06-07 PROCEDURE — 83735 ASSAY OF MAGNESIUM: CPT | Performed by: EMERGENCY MEDICINE

## 2024-06-07 PROCEDURE — 87205 SMEAR GRAM STAIN: CPT | Mod: ELYLAB | Performed by: EMERGENCY MEDICINE

## 2024-06-07 RX ORDER — OMEPRAZOLE 40 MG/1
40 CAPSULE, DELAYED RELEASE ORAL DAILY
Qty: 30 CAPSULE | Refills: 0 | Status: SHIPPED | OUTPATIENT
Start: 2024-06-07

## 2024-06-07 RX ADMIN — IOHEXOL 10 ML: 350 INJECTION, SOLUTION INTRAVENOUS at 15:38

## 2024-06-07 ASSESSMENT — HEART SCORE
RISK FACTORS: >2 RISK FACTORS OR HX OF ATHEROSCLEROTIC DISEASE
HISTORY: SLIGHTLY SUSPICIOUS
ECG: NORMAL
HEART SCORE: 2
TROPONIN: LESS THAN OR EQUAL TO NORMAL LIMIT
AGE: <45

## 2024-06-07 ASSESSMENT — LIFESTYLE VARIABLES
EVER FELT BAD OR GUILTY ABOUT YOUR DRINKING: NO
TOTAL SCORE: 0
HAVE YOU EVER FELT YOU SHOULD CUT DOWN ON YOUR DRINKING: NO
HAVE PEOPLE ANNOYED YOU BY CRITICIZING YOUR DRINKING: NO
EVER HAD A DRINK FIRST THING IN THE MORNING TO STEADY YOUR NERVES TO GET RID OF A HANGOVER: NO

## 2024-06-07 ASSESSMENT — PAIN SCALES - GENERAL: PAINLEVEL_OUTOF10: 6

## 2024-06-07 ASSESSMENT — PAIN DESCRIPTION - FREQUENCY: FREQUENCY: CONSTANT/CONTINUOUS

## 2024-06-07 ASSESSMENT — PAIN DESCRIPTION - DESCRIPTORS
DESCRIPTORS: SHARP
DESCRIPTORS: HEAVINESS
DESCRIPTORS: DULL

## 2024-06-07 ASSESSMENT — PAIN - FUNCTIONAL ASSESSMENT
PAIN_FUNCTIONAL_ASSESSMENT: 0-10
PAIN_FUNCTIONAL_ASSESSMENT: 0-10

## 2024-06-07 ASSESSMENT — PAIN DESCRIPTION - PAIN TYPE: TYPE: ACUTE PAIN

## 2024-06-07 ASSESSMENT — PAIN DESCRIPTION - ORIENTATION
ORIENTATION: MID;UPPER
ORIENTATION: MID

## 2024-06-07 ASSESSMENT — PAIN DESCRIPTION - LOCATION
LOCATION: CHEST
LOCATION: CHEST

## 2024-06-07 NOTE — DISCHARGE INSTRUCTIONS
You have been given a referral to a cardiologist.  Please follow-up with them.  Seek immediate medical attention if you develop: worsening chest pain, new chest pain, nausea, vomiting, weakness, numbness, tingling, excessive sweating, shortness of breath, difficulty breathing, loss of motion in your arms or legs, or any new or worsening symptoms.  These documents have a lot of useful information! Please read them, so you know what to expect, what you can do for yourself at home, and also to know concerning signs warrant a return to the Emergency Department for additional evaluation.  You are welcome back any time. Thank you for entrusting your care to us, I hope we made your visit as pleasant as possible. Wishing you well!    Dr. Amato

## 2024-06-07 NOTE — ED PROVIDER NOTES
"HPI   Chief Complaint   Patient presents with    Chest Pain     Midsternal chest pain x1 hr; non radiating         History provided by:  Patient and EMS personnel    History of Present Illness:  33-year-old male presents with 1 hour of midsternal chest pain with no radiation.  It is described as sharp and stabbing.  No fever, chills or cough.  No back or flank pain.  No abdominal pain.  No nausea or vomiting.  No diaphoresis.  No leg swelling.  Nothing seems to make it worse or better.  No trauma or travel.  No sick contacts.  The patient was evaluated for similar yesterday in the emergency department and had a negative workup.      PMFSH:   As per HPI, otherwise nurses notes reviewed in EMR.    Past Medical History:   Active Ambulatory Problems     Diagnosis Date Noted    No Active Ambulatory Problems     Resolved Ambulatory Problems     Diagnosis Date Noted    No Resolved Ambulatory Problems     Past Medical History:   Diagnosis Date    CHF (congestive heart failure) (Multi)     MI (myocardial infarction) (Multi)       Past Surgical History:   Past Surgical History:   Procedure Laterality Date    HERNIA REPAIR        Family History: No family history on file.   Social History:    Social History     Socioeconomic History    Marital status: Single     Spouse name: Not on file    Number of children: Not on file    Years of education: Not on file    Highest education level: Not on file   Occupational History    Not on file   Tobacco Use    Smoking status: Every Day     Current packs/day: 1.00     Types: Cigarettes    Smokeless tobacco: Never   Substance and Sexual Activity    Alcohol use: Never    Drug use: Yes     Types: \"Crack\" cocaine, Cocaine, Heroin, MDMA (ecstacy)    Sexual activity: Not on file   Other Topics Concern    Not on file   Social History Narrative    Not on file     Social Determinants of Health     Financial Resource Strain: Not on file   Food Insecurity: Not on file   Transportation Needs: Not on " "file   Physical Activity: Not on file   Stress: Not on file   Social Connections: Not on file   Intimate Partner Violence: Not on file   Housing Stability: Not on file                        Patty Coma Scale Score: 15   HEART Score: 2                   Patient History   Past Medical History:   Diagnosis Date    CHF (congestive heart failure) (Multi)     MI (myocardial infarction) (Multi)      Past Surgical History:   Procedure Laterality Date    HERNIA REPAIR       No family history on file.  Social History     Tobacco Use    Smoking status: Every Day     Current packs/day: 1.00     Types: Cigarettes    Smokeless tobacco: Never   Substance Use Topics    Alcohol use: Never    Drug use: Yes     Types: \"Crack\" cocaine, Cocaine, Heroin, MDMA (ecstacy)       Physical Exam   ED Triage Vitals [06/07/24 1356]   Temperature Heart Rate Respirations BP   36.3 °C (97.3 °F) (!) 102 18 (!) 152/97      Pulse Ox Temp src Heart Rate Source Patient Position   100 % -- -- Lying      BP Location FiO2 (%)     Left arm --       Physical Exam  Physical Exam:    ED Triage Vitals [06/07/24 1356]   Temperature Heart Rate Respirations BP   36.3 °C (97.3 °F) (!) 102 18 (!) 152/97      Pulse Ox Temp src Heart Rate Source Patient Position   100 % -- -- Lying      BP Location FiO2 (%)     Left arm --         Constitutional: Vital signs per nursing notes.  Well developed, well nourished.  No acute distress.  Patient is groggy, but arousable to verbal stimuli, suspect opiate use  Psychiatric: alert and oriented to person, place, and time; no abnormalities of mood or affect; memory intact  Eyes: PERRL; conjunctivae and lids normal; EOMI  ENT: otoscopic exam of external canal and TM´s normal; nasal mucosa, turbinates, and septum normal; mouth, tongue, and pharynx normal; pharynx without edema, exudate, or injection  Respiratory: normal respiratory effort and excursion; no rales, rhonchi, or wheezes; equal air entry  Cardiovascular: Mildly " tachycardic, regular rate and rhythm; no murmurs, rubs or gallops; symmetric pulses; no edema; normal capillary refill; distal pulses present  Neurological: normal speech; CN II-XII grossly intact; normal motor and sensory function; no nystagmus  GI: no masses, tenderness, rebound or guarding; no palpable, pulsatile mass; no organomegaly; no hernia; normal bowel sounds; (-) Arora´s sign; (-) McBurney´s sign; (-) CVA tenderness  Lymphatic: no adenopathy of neck  Musculoskeletal: normal gait and station; normal digits and nails; no gross tendon or ligament injury; normal to palpation; normal strength/tone; neurovascular status intact; (-) Marla´s sign; (-) straight leg raise  Skin: normal to inspection; normal to palpation; no rash  GCS: 15    ED Course & MDM   Diagnoses as of 06/07/24 1656   Chest pain, unspecified type   Anemia, unspecified type   Elevated d-dimer       Medical Decision Making  Medical Decision Making:    Differential Diagnoses Considered: ACS, arrhythmia, PE, dissection, gastritis, muscle strain     EKG: My interpretation of EKG is sinus tachycardia 104 bpm with no acute ST-T changes.             My interpretation of second EKG is normal sinus rhythm at 75 bpm with no acute ST-T changes.  There are no significant changes from the initial EKG except the decreased heart rate.    Labs Reviewed   CBC WITH AUTO DIFFERENTIAL - Abnormal       Result Value    WBC 3.9 (*)     nRBC 0.0      RBC 5.03      Hemoglobin 12.6 (*)     Hematocrit 39.2 (*)     MCV 78 (*)     MCH 25.0 (*)     MCHC 32.1      RDW 16.7 (*)     Platelets 182      Neutrophils % 68.2      Immature Granulocytes %, Automated 0.3      Lymphocytes % 24.1      Monocytes % 5.1      Eosinophils % 1.5      Basophils % 0.8      Neutrophils Absolute 2.69      Immature Granulocytes Absolute, Automated 0.01      Lymphocytes Absolute 0.95 (*)     Monocytes Absolute 0.20      Eosinophils Absolute 0.06      Basophils Absolute 0.03     COMPREHENSIVE  METABOLIC PANEL - Abnormal    Glucose 102 (*)     Sodium 139      Potassium 3.8      Chloride 107      Bicarbonate 25      Anion Gap 11      Urea Nitrogen 7      Creatinine 0.82      eGFR >90      Calcium 9.2      Albumin 4.2      Alkaline Phosphatase 58      Total Protein 6.9      AST 20      Bilirubin, Total 1.0      ALT 18     D-DIMER, VTE EXCLUSION - Abnormal    D-Dimer, Quantitative VTE Exclusion 620 (*)     Narrative:     The VTE Exclusion D-Dimer assay is reported in ng/mL Fibrinogen Equivalent Units (FEU).    Per 's instructions for use, a value of less than 500 ng/mL (FEU) may help to exclude DVT or PE in outpatients when the assay is used with a clinical pretest probability assessment.([facility] must utilize and document eCalc 'Wells Score Deep Vein Thrombosis Risk' for DVT exclusion only. Emergency Department should utilize  Guidelines for Emergency Department Use of the VTE Exclusion D-Dimer and Clinical Pretest probability assessment model for DVT or PE exclusion.)   MAGNESIUM - Normal    Magnesium 1.78     B-TYPE NATRIURETIC PEPTIDE - Normal    BNP 38      Narrative:        <100 pg/mL - Heart failure unlikely  100-299 pg/mL - Intermediate probability of acute heart                  failure exacerbation. Correlate with clinical                  context and patient history.    >=300 pg/mL - Heart Failure likely. Correlate with clinical                  context and patient history.    BNP testing is performed using different testing methodology at [facility] than at other [facility] hospitals. Direct result comparisons should only be made within the same method.      SERIAL TROPONIN-INITIAL - Normal    Troponin I, High Sensitivity 7      Narrative:     Less than 99th percentile of normal range cutoff-  Female and children under 18 years old <14 ng/L; Male <21 ng/L: Negative  Repeat testing should be performed if clinically indicated.     Female and children under 18 years old 14-50  ng/L; Male 21-50 ng/L:  Consistent with possible cardiac damage and possible increased clinical   risk. Serial measurements may help to assess extent of myocardial damage.     >50 ng/L: Consistent with cardiac damage, increased clinical risk and  myocardial infarction. Serial measurements may help assess extent of   myocardial damage.      NOTE: Children less than 1 year old may have higher baseline troponin   levels and results should be interpreted in conjunction with the overall   clinical context.     NOTE: Troponin I testing is performed using a different   testing methodology at [facility] than at other   [facility] hospitals. Direct result comparisons should only   be made within the same method.   LIPASE - Normal    Lipase 13      Narrative:     Venipuncture immediately after or during the administration of Metamizole may lead to falsely low results. Testing should be performed immediately prior to Metamizole dosing.   SERIAL TROPONIN, 1 HOUR - Normal    Troponin I, High Sensitivity 8      Narrative:     Less than 99th percentile of normal range cutoff-  Female and children under 18 years old <14 ng/L; Male <21 ng/L: Negative  Repeat testing should be performed if clinically indicated.     Female and children under 18 years old 14-50 ng/L; Male 21-50 ng/L:  Consistent with possible cardiac damage and possible increased clinical   risk. Serial measurements may help to assess extent of myocardial damage.     >50 ng/L: Consistent with cardiac damage, increased clinical risk and  myocardial infarction. Serial measurements may help assess extent of   myocardial damage.      NOTE: Children less than 1 year old may have higher baseline troponin   levels and results should be interpreted in conjunction with the overall   clinical context.     NOTE: Troponin I testing is performed using a different   testing methodology at [facility] than at other   [facility]. Direct result comparisons  should only   be made within the same method.   BLOOD CULTURE   BLOOD CULTURE   TROPONIN SERIES- (INITIAL, 1 HR)    Narrative:     The following orders were created for panel order Troponin I Series, High Sensitivity (0, 1 HR).  Procedure                               Abnormality         Status                     ---------                               -----------         ------                     Troponin I, High Sensiti...[[id number]]  Normal              Final result               Troponin, High Sensitivi...[[id number]]  Normal              Final result                 Please view results for these tests on the individual orders.       CT angio chest for pulmonary embolism   Final Result   CT scan not performed at request of the patient.        MACRO:   None        Signed by: Manoj Dyer 6/7/2024 3:55 PM   Dictation workstation:   [id number]      XR chest 1 view   Final Result   No active disease in the chest.        MACRO:   None        Signed by: Manoj Dyer 6/7/2024 2:49 PM   Dictation workstation:   [id number]          Diagnostic testing considered:         Review of recent and relevant records:    ED Medication Administration:   ED Medication Administration from 06/07/2024 1349 to 06/07/2024 1656         Date/Time Order Dose Route Action Action by     06/07/2024 1538 EDT iohexol (OMNIPaque) 350 mg iodine/mL solution 75 mL 10 mL intravenous Given , S            Prescription Medication Consideration/Given:     Social Determinants of Health Significantly Affecting Care:      Summary:    BP      Temp      Pulse     Resp      SpO2        Abnormal Labs Reviewed   CBC WITH AUTO DIFFERENTIAL - Abnormal; Notable for the following components:       Result Value    WBC 3.9 (*)     Hemoglobin 12.6 (*)     Hematocrit 39.2 (*)     MCV 78 (*)     MCH 25.0 (*)     RDW 16.7 (*)     Lymphocytes Absolute 0.95 (*)     All other components within normal limits   COMPREHENSIVE METABOLIC PANEL - Abnormal; Notable for the  following components:    Glucose 102 (*)     All other components within normal limits   D-DIMER, VTE EXCLUSION - Abnormal; Notable for the following components:    D-Dimer, Quantitative VTE Exclusion 620 (*)     All other components within normal limits    Narrative:     The VTE Exclusion D-Dimer assay is reported in ng/mL Fibrinogen Equivalent Units (FEU).    Per 's instructions for use, a value of less than 500 ng/mL (FEU) may help to exclude DVT or PE in outpatients when the assay is used with a clinical pretest probability assessment.(AE must utilize and document eCalc 'Wells Score Deep Vein Thrombosis Risk' for DVT exclusion only. Emergency Department should utilize  Guidelines for Emergency Department Use of the VTE Exclusion D-Dimer and Clinical Pretest probability assessment model for DVT or PE exclusion.)     The patient was taken to CT in order to get a CT with IV contrast to evaluate for the possibility of PE or dissection so the patient did have an elevated D-dimer with his chest pain.  However, the patient said it hurt too much while he was on the CT table and he refused to get the test performed.  The patient fully understands that without this test will not be able to diagnose either of those potentially life-threatening conditions.    Diagnostic evaluation was completed.  2 sets of high-sensitivity troponin were in the normal range.  Therefore I do not suspect acute coronary syndrome.  BNP was in normal range so I do not suspect congestive heart failure.  Lipase in the normal range so do not suspect pancreatitis.  Metabolic panel is unremarkable including a normal sodium and potassium level.  Renal and liver function is also normal.  D-dimer was slightly elevated at 620.  This is why I recommended doing a CT with IV contrast with the patient declined.  CBC shows a slightly low white blood cell count at 3.9.  There is also mild anemia with a hemoglobin of 12.6.  Platelets are in the  "normal range at 182.  Chest x-ray shows no active disease in the chest.    Heart score 2.    No urgent or emergent conditions been identified.  The patient will be discharged home with short-term follow-up with cardiology.  The patient's symptoms may also be related to reflux so I will start him on a proton pump inhibitor.  I will also recommend that he follow-up with primary care.  He may ultimately require referral to GI for possible endoscopy if his symptoms continue.    I discussed the results and discharge plan with the patient and/or family/friend if present.  I emphasized the importance of follow up with the physician I referred them to in the timeframe recommended.  I explained reasons for the patient to return to the Emergency Department.  Questions were addressed.  The patient and/or family/friend expressed understanding.      I utilized an evidence-based risk rating tool (CMT) along with my training and experience to weigh the risk of discharge against the risks of further testing, imaging, or hospitalization. At this time I estimate the risks of additional testing, imaging, or hospitalization to be equal to or greater than the risk of discharge. I discussed my risk assessment with the patient and the patient consents to the risk of discharge as well as the risk of uncertainty in estimating outcomes.    The patient's HEART Score is <4. In rare cases, I give patients with HEART Score of 4 the option of discharge, but only when they meet criteria for \"Low 4,\" meaning that HST was used, and the 4 is not from a highly suspicious story, highly suspicious EKG, or positive cardiac enzymes. In these selected cases, the risk of a \"Low 4\" is still most likely lower than the risk of admission and further testing/imaging. [id number]             Diagnoses as of 06/07/24 1656   Chest pain, unspecified type   Anemia, unspecified type   Elevated d-dimer         Procedure  Procedures     Codey Guevara V, " MD  06/07/24 0626

## 2024-06-08 NOTE — ED NOTES
Attempted to call pt about positive blood culture. Phone number is not working. I also called [employer] as the pt ws discharged to police custody, they did not have any information. [facility] senior living released on 6-7.      Micah Aragon RN  06/08/24 6067

## 2024-06-12 LAB
BACTERIA BLD AEROBE CULT: ABNORMAL
BACTERIA BLD CULT: ABNORMAL
BACTERIA BLD CULT: NORMAL
GRAM STN SPEC: ABNORMAL

## 2024-06-16 LAB
ATRIAL RATE: 104 BPM
ATRIAL RATE: 75 BPM
ATRIAL RATE: 97 BPM
P AXIS: 58 DEGREES
P AXIS: 71 DEGREES
P AXIS: 75 DEGREES
P OFFSET: 208 MS
P OFFSET: 208 MS
P OFFSET: 209 MS
P ONSET: 161 MS
P ONSET: 164 MS
P ONSET: 166 MS
PR INTERVAL: 110 MS
PR INTERVAL: 112 MS
PR INTERVAL: 122 MS
Q ONSET: 219 MS
Q ONSET: 222 MS
Q ONSET: 222 MS
QRS COUNT: 12 BEATS
QRS COUNT: 16 BEATS
QRS COUNT: 17 BEATS
QRS DURATION: 70 MS
QRS DURATION: 76 MS
QRS DURATION: 76 MS
QT INTERVAL: 344 MS
QT INTERVAL: 362 MS
QT INTERVAL: 382 MS
QTC CALCULATION(BAZETT): 426 MS
QTC CALCULATION(BAZETT): 452 MS
QTC CALCULATION(BAZETT): 459 MS
QTC FREDERICIA: 411 MS
QTC FREDERICIA: 413 MS
QTC FREDERICIA: 424 MS
R AXIS: 47 DEGREES
R AXIS: 53 DEGREES
R AXIS: 70 DEGREES
T AXIS: 56 DEGREES
T AXIS: 61 DEGREES
T AXIS: 66 DEGREES
T OFFSET: 394 MS
T OFFSET: 400 MS
T OFFSET: 413 MS
VENTRICULAR RATE: 104 BPM
VENTRICULAR RATE: 75 BPM
VENTRICULAR RATE: 97 BPM